# Patient Record
Sex: FEMALE | Race: WHITE | URBAN - METROPOLITAN AREA
[De-identification: names, ages, dates, MRNs, and addresses within clinical notes are randomized per-mention and may not be internally consistent; named-entity substitution may affect disease eponyms.]

---

## 2019-04-23 ENCOUNTER — OFFICE VISIT (OUTPATIENT)
Dept: SURGERY | Age: 29
End: 2019-04-23

## 2019-04-23 VITALS
HEART RATE: 81 BPM | WEIGHT: 131 LBS | HEIGHT: 65 IN | SYSTOLIC BLOOD PRESSURE: 122 MMHG | DIASTOLIC BLOOD PRESSURE: 78 MMHG | BODY MASS INDEX: 21.83 KG/M2

## 2019-04-23 DIAGNOSIS — N63.10 BREAST MASS, RIGHT: Primary | ICD-10-CM

## 2019-04-23 NOTE — PATIENT INSTRUCTIONS
Breast Lumps: Care Instructions  Your Care Instructions  Breast lumps are common, especially in women between ages 27 and 48. Many women's breasts feel lumpy and tender before their menstrual period. Women also may have lumps when they are breastfeeding. Breast lumps may go away after menopause. All new breast lumps in women after menopause should be checked by a doctor. Although lumps may be normal for you, it is important to have your doctor check any lump or thickness that is not like the rest of your breast to make sure it is not cancer. A lump may be larger, harder, or different from the rest of your breast tissue. Follow-up care is a key part of your treatment and safety. Be sure to make and go to all appointments, and call your doctor if you are having problems. It's also a good idea to know your test results and keep a list of the medicines you take. How can you care for yourself at home? · Make an appointment to have a mammogram and other follow-up visits as recommended by your doctor. When should you call for help? Watch closely for changes in your health, and be sure to contact your doctor if:    · You do not get better as expected.     · Your breast has changed.     · You have pain in your breast.     · You have a discharge from your nipple.     · A breast lump changes or does not go away. Where can you learn more? Go to http://geno-kristy.info/. Enter Z008 in the search box to learn more about \"Breast Lumps: Care Instructions. \"  Current as of: May 14, 2018  Content Version: 11.9  © 7442-2522 [employer]. Care instructions adapted under license by [employer] (which disclaims liability or warranty for this information). If you have questions about a medical condition or this instruction, always ask your healthcare professional. [address] any warranty or liability for your use of this information.

## 2019-04-23 NOTE — PROGRESS NOTES
HISTORY OF PRESENT ILLNESS  Radha Gordon is a 33 y.o. female. HPI NEW patient referral for consultation by Dr. Cristin Reid for a new RIGHT breast palpable lump. It is tender to palpation. The patient found the lump last Thursday. It is not painful. There is a vein that runs over the mass. She stopped nursing her 11 month old son, Abraham Moody, 1 month ago. She noticed a lump in this area when she was nursing her 1st child. It was blue and attributed to a vein, and resolved until last week. OB History    None      Obstetric Comments   Menarche 14, LMP 4/19/2019, # of children 2, age of 4st delivery 31, Hysterectomy/oophorectomy no/no, Breast bx none, history of breast feeding yes, BCP yes, Hormone therapy none           Family history - no breast or ovarian cancer  No imaging    Review of Systems   Constitutional: Negative. HENT: Negative. Eyes: Negative. Respiratory: Negative. Cardiovascular: Negative. Gastrointestinal: Negative. Genitourinary: Negative. Musculoskeletal: Negative. Skin: Negative. Neurological: Negative. Endo/Heme/Allergies: Negative. Psychiatric/Behavioral: Negative. Physical Exam   Pulmonary/Chest: Right breast exhibits mass (1.5 cm oval, soft and very mobile mass 12:00 1/3). Right breast exhibits no nipple discharge, no skin change and no tenderness. Left breast exhibits no mass, no nipple discharge, no skin change and no tenderness. Breasts are symmetrical.       Lymphadenopathy:     She has no cervical adenopathy. She has no axillary adenopathy. Right: No supraclavicular adenopathy present. Left: No supraclavicular adenopathy present. ASPIRATION OF HEMATOMA  Indication : Hematoma: Right Breast 12:00 1/3  Findings: oval cystic mass 1.3 cm. Prep : Alcohol. Guidance : Ultrasound guidance. Yield :  1/2cc old blood was aspirated with an 18 gauge needle. Effect : I was only able to aspirate a small fraction of the cystic mass. Diposition:  Follow up if mass gets larger or symptomatic. ASSESSMENT and PLAN    ICD-10-CM ICD-9-CM    1. Breast mass, right N63.10 611.72      Spontaneous hematoma secondary to an ectatic duct. I was expecting a galactocele, but the mass only aspirated partially and the aspirate was old blood. The remainder of the blood may reabsorb completely. Follow up if the mass gets larger or symptomatic.

## 2019-04-23 NOTE — LETTER
4/23/19 Patient: Winston Riggs YOB: 1990 Date of Visit: 4/23/2019 [address] [facility] Joanna Sylvester [address] [id number] VIA Facsimile: [phone] Christiana Shi MD 
[address] [address] VIA Facsimile: [phone] Dear DO Christiana Villalta MD, Thank you for referring Ms. Winston Riggs to [facility] for evaluation. My notes for this consultation are attached. If you have questions, please do not hesitate to call me. I look forward to following your patient along with you.  
 
 
Sincerely, 
 
Laury Banda MD